# Patient Record
Sex: FEMALE | Race: WHITE | HISPANIC OR LATINO | URBAN - METROPOLITAN AREA
[De-identification: names, ages, dates, MRNs, and addresses within clinical notes are randomized per-mention and may not be internally consistent; named-entity substitution may affect disease eponyms.]

---

## 2019-09-25 ENCOUNTER — TELEPHONE (OUTPATIENT)
Dept: NEUROSURGERY | Age: 34
End: 2019-09-25

## 2019-10-11 ENCOUNTER — OFFICE VISIT (OUTPATIENT)
Dept: NEUROSURGERY | Age: 34
End: 2019-10-11

## 2019-10-11 ENCOUNTER — IMAGING SERVICES (OUTPATIENT)
Dept: GENERAL RADIOLOGY | Age: 34
End: 2019-10-11

## 2019-10-11 VITALS
HEART RATE: 99 BPM | RESPIRATION RATE: 16 BRPM | WEIGHT: 122 LBS | OXYGEN SATURATION: 97 % | HEIGHT: 59 IN | BODY MASS INDEX: 24.6 KG/M2 | SYSTOLIC BLOOD PRESSURE: 130 MMHG | DIASTOLIC BLOOD PRESSURE: 87 MMHG

## 2019-10-11 DIAGNOSIS — M54.50 CHRONIC BILATERAL LOW BACK PAIN WITHOUT SCIATICA: Primary | ICD-10-CM

## 2019-10-11 DIAGNOSIS — G89.29 CHRONIC BILATERAL LOW BACK PAIN WITHOUT SCIATICA: Primary | ICD-10-CM

## 2019-10-11 DIAGNOSIS — M43.17 SPONDYLOLISTHESIS OF LUMBOSACRAL REGION: ICD-10-CM

## 2019-10-11 PROBLEM — M43.10 SPONDYLISTHESIS: Status: ACTIVE | Noted: 2019-10-11

## 2019-10-11 PROCEDURE — 72110 X-RAY EXAM L-2 SPINE 4/>VWS: CPT | Performed by: INTERNAL MEDICINE

## 2019-10-11 PROCEDURE — 99202 OFFICE O/P NEW SF 15 MIN: CPT | Performed by: PHYSICIAN ASSISTANT

## 2019-10-11 SDOH — HEALTH STABILITY: MENTAL HEALTH: HOW OFTEN DO YOU HAVE A DRINK CONTAINING ALCOHOL?: MONTHLY OR LESS

## 2019-10-11 ASSESSMENT — ENCOUNTER SYMPTOMS
BACK PAIN: 1
CONSTITUTIONAL NEGATIVE: 1
WEAKNESS: 0
NUMBNESS: 0

## 2019-10-29 ENCOUNTER — TELEPHONE (OUTPATIENT)
Dept: NEUROSURGERY | Age: 34
End: 2019-10-29

## 2019-11-01 ENCOUNTER — TELEPHONE (OUTPATIENT)
Dept: NEUROSURGERY | Age: 34
End: 2019-11-01

## 2019-11-01 DIAGNOSIS — M43.17 SPONDYLOLISTHESIS OF LUMBOSACRAL REGION: Primary | ICD-10-CM

## 2019-11-27 PROBLEM — M43.10 SPONDYLISTHESIS: Status: ACTIVE | Noted: 2019-10-11

## 2020-05-04 ENCOUNTER — APPOINTMENT (OUTPATIENT)
Dept: GENERAL RADIOLOGY | Age: 35
End: 2020-05-04

## 2020-05-04 ENCOUNTER — APPOINTMENT (OUTPATIENT)
Dept: NEUROSURGERY | Age: 35
End: 2020-05-04

## 2025-05-13 ENCOUNTER — OFFICE VISIT (OUTPATIENT)
Facility: LOCATION | Age: 40
End: 2025-05-13
Payer: COMMERCIAL

## 2025-05-13 DIAGNOSIS — J30.2 SEASONAL ALLERGIC RHINITIS, UNSPECIFIED TRIGGER: Primary | ICD-10-CM

## 2025-05-13 DIAGNOSIS — J34.3 NASAL TURBINATE HYPERTROPHY: ICD-10-CM

## 2025-05-13 DIAGNOSIS — J34.2 DEVIATED SEPTUM: ICD-10-CM

## 2025-05-13 PROCEDURE — 99213 OFFICE O/P EST LOW 20 MIN: CPT | Performed by: STUDENT IN AN ORGANIZED HEALTH CARE EDUCATION/TRAINING PROGRAM

## 2025-05-13 RX ORDER — AZELASTINE 1 MG/ML
2 SPRAY, METERED NASAL 2 TIMES DAILY
Qty: 30 ML | Refills: 0 | Status: SHIPPED | OUTPATIENT
Start: 2025-05-13

## 2025-05-13 NOTE — PROGRESS NOTES
Breanna Morales is a 39 year old female.   Chief Complaint   Patient presents with    Sinus Problem     Few years ago she was told her sinus infections have been caused by her deviated septum. Over the years she feels it has gotten worse and difficult to breathe     Ear Problem     Feels like her ears have build up because of her ear infections      HPI:   39 year old female presenting for evaluation of sinonasal complaints. She reports nasal obstruction, clear nasal discharge and postnasal drip.  She denies facial pressure and hyposmia.  She has been told that she had a deviated septum in the past and surgery was indicated but she declined. Pt does have a history of seasonal and environmental allergies. Pt has 1-2 episodes of sinusitis per year that require antibiotics. She takes zyrtec and uses nasacort twice daily.      Current Medications[1]   Past Medical History[2]   Social History:  Short Social Hx on File[3]   Past Surgical History[4]      REVIEW OF SYSTEMS:   GENERAL HEALTH: feels well otherwise  GENERAL : denies fever, chills, sweats, weight loss, weight gain  SKIN: denies any unusual skin lesions or rashes  RESPIRATORY: denies shortness of breath with exertion  NEURO: denies headaches    EXAM:   There were no vitals taken for this visit.    System Findings Details   Constitutional  Overall appearance - Normal.   Head/Face  Facial features -- Normal. Skull - Normal.   Eyes  Sclera white, pupils equal and round, EOMI   Ears  External ears normal in appearance, EACs patent, TMs intact bilaterally, no evidence of middle ear effusion   Nose  External nose normal in appearance, nares patent, septum deviated to left, right inferior turbinate hypertrophy, no epistaxis   Throat  Posterior pharynx clear, uvula midline, tonsils 1+   Oral cavity  Lips normal in appearance, mucous membranes clear, no masses, FOM soft, tongue without lesions   Neck  Trachea midline, no lymphadenopathy, no masses   Neurological  Memory  - Normal. Cranial nerves - Cranial nerves II through XII grossly intact.       ASSESSMENT AND PLAN:   39 year old female presenting for evaluation of sinonasal complaints.     - Continue Zyrtec and Nasacort  - Start twice daily Astelin  -Discussed using saline nasal spray or nasal irrigations   - Follow-up in 2 months    The patient indicates understanding of these issues and agrees to the plan.    Belle Ordoñez MD, LEANNE  Facial Plastic & Reconstructive Surgery, Otolaryngology-Head & Neck Surgery  [facility]    This note was prepared using [employer] voice recognition dictation software. As a result, errors may occur. When identified, these errors have been corrected. While every attempt is made to correct errors during dictation, discrepancies may still exist.          [1]   Current Outpatient Medications   Medication Sig Dispense Refill    azithromycin 250 MG Oral Tab Take 2 tablets by mouth on day 1, then take 1 tablet daily for days 2-5. 6 tablet 0   [2] No past medical history on file.  [3]   Social History  Socioeconomic History    Marital status: Single   Tobacco Use    Smoking status: Never    Smokeless tobacco: Never   Substance and Sexual Activity    Alcohol use: Yes     Alcohol/week: 2.0 standard drinks of alcohol     Types: 2 Standard drinks or equivalent per week    Drug use: No   Other Topics Concern    Seat Belt Yes   [4] No past surgical history on file.